# Patient Record
Sex: FEMALE | Race: WHITE | Employment: OTHER | URBAN - METROPOLITAN AREA
[De-identification: names, ages, dates, MRNs, and addresses within clinical notes are randomized per-mention and may not be internally consistent; named-entity substitution may affect disease eponyms.]

---

## 2017-07-26 ENCOUNTER — OFFICE VISIT (OUTPATIENT)
Dept: CARDIOLOGY CLINIC | Age: 77
End: 2017-07-26

## 2017-07-26 VITALS
SYSTOLIC BLOOD PRESSURE: 114 MMHG | DIASTOLIC BLOOD PRESSURE: 60 MMHG | OXYGEN SATURATION: 92 % | BODY MASS INDEX: 37.69 KG/M2 | WEIGHT: 192 LBS | HEART RATE: 76 BPM | HEIGHT: 60 IN

## 2017-07-26 DIAGNOSIS — I10 ESSENTIAL HYPERTENSION: ICD-10-CM

## 2017-07-26 DIAGNOSIS — I25.10 CORONARY ARTERY DISEASE INVOLVING NATIVE CORONARY ARTERY OF NATIVE HEART WITHOUT ANGINA PECTORIS: Primary | ICD-10-CM

## 2017-07-26 PROCEDURE — 99214 OFFICE O/P EST MOD 30 MIN: CPT | Performed by: INTERNAL MEDICINE

## 2017-07-27 ENCOUNTER — TELEPHONE (OUTPATIENT)
Dept: CARDIOLOGY CLINIC | Age: 77
End: 2017-07-27

## 2017-07-27 RX ORDER — LEVOTHYROXINE SODIUM 0.03 MG/1
25 TABLET ORAL DAILY
COMMUNITY
End: 2021-03-17

## 2017-12-05 RX ORDER — CLOPIDOGREL BISULFATE 75 MG/1
TABLET ORAL
Qty: 30 TABLET | Refills: 5 | Status: SHIPPED | OUTPATIENT
Start: 2017-12-05 | End: 2018-06-13 | Stop reason: SDUPTHER

## 2018-01-05 RX ORDER — ATORVASTATIN CALCIUM 40 MG/1
TABLET, FILM COATED ORAL
Qty: 30 TABLET | Refills: 10 | Status: SHIPPED | OUTPATIENT
Start: 2018-01-05 | End: 2018-11-16 | Stop reason: SDUPTHER

## 2018-01-08 RX ORDER — METOPROLOL SUCCINATE 25 MG/1
TABLET, EXTENDED RELEASE ORAL
Qty: 30 TABLET | Refills: 10 | Status: SHIPPED | OUTPATIENT
Start: 2018-01-08 | End: 2019-10-18 | Stop reason: SDUPTHER

## 2018-01-08 RX ORDER — FUROSEMIDE 20 MG/1
TABLET ORAL
Qty: 30 TABLET | Refills: 10 | Status: SHIPPED | OUTPATIENT
Start: 2018-01-08 | End: 2021-03-12 | Stop reason: SDUPTHER

## 2018-01-08 RX ORDER — LOSARTAN POTASSIUM 25 MG/1
TABLET ORAL
Qty: 30 TABLET | Refills: 10 | Status: SHIPPED | OUTPATIENT
Start: 2018-01-08 | End: 2019-11-11 | Stop reason: ALTCHOICE

## 2018-06-15 RX ORDER — CLOPIDOGREL BISULFATE 75 MG/1
TABLET ORAL
Qty: 90 TABLET | Refills: 3 | Status: SHIPPED | OUTPATIENT
Start: 2018-06-15 | End: 2019-10-18 | Stop reason: SDUPTHER

## 2018-07-12 ENCOUNTER — OFFICE VISIT (OUTPATIENT)
Dept: CARDIOLOGY CLINIC | Age: 78
End: 2018-07-12

## 2018-07-12 VITALS
BODY MASS INDEX: 35.93 KG/M2 | HEART RATE: 72 BPM | DIASTOLIC BLOOD PRESSURE: 64 MMHG | SYSTOLIC BLOOD PRESSURE: 112 MMHG | WEIGHT: 183 LBS | OXYGEN SATURATION: 97 % | HEIGHT: 60 IN

## 2018-07-12 DIAGNOSIS — I25.10 CORONARY ARTERY DISEASE INVOLVING NATIVE CORONARY ARTERY OF NATIVE HEART WITHOUT ANGINA PECTORIS: Primary | ICD-10-CM

## 2018-07-12 DIAGNOSIS — I10 ESSENTIAL HYPERTENSION: ICD-10-CM

## 2018-07-12 DIAGNOSIS — I65.29 STENOSIS OF CAROTID ARTERY, UNSPECIFIED LATERALITY: ICD-10-CM

## 2018-07-12 PROCEDURE — 99214 OFFICE O/P EST MOD 30 MIN: CPT | Performed by: INTERNAL MEDICINE

## 2018-07-12 PROCEDURE — 93000 ELECTROCARDIOGRAM COMPLETE: CPT | Performed by: INTERNAL MEDICINE

## 2018-07-12 NOTE — PROGRESS NOTES
bowel sounds,  No organomegaly  Extremities: No Cyanosis or Clubbing; Edema, none  Neurological: Oriented to time, place, and person, Non-anxious  Psychiatric: Normal mood and affect  Skin: Warm and dry,  No rash seen    Outpatient Prescriptions Marked as Taking for the 7/12/18 encounter (Office Visit) with Nita Rangel MD   Medication Sig Dispense Refill    clopidogrel (PLAVIX) 75 MG tablet TAKE ONE TABLET BY MOUTH DAILY 90 tablet 3    furosemide (LASIX) 20 MG tablet TAKE ONE TABLET BY MOUTH DAILY 30 tablet 10    metoprolol succinate (TOPROL XL) 25 MG extended release tablet TAKE ONE TABLET BY MOUTH DAILY 30 tablet 10    losartan (COZAAR) 25 MG tablet TAKE ONE TABLET BY MOUTH DAILY 30 tablet 10    atorvastatin (LIPITOR) 40 MG tablet TAKE ONE TABLET BY MOUTH NIGHTLY 30 tablet 10    levothyroxine (SYNTHROID) 25 MCG tablet Take 25 mcg by mouth Daily      nitroGLYCERIN (NITROSTAT) 0.4 MG SL tablet Place 1 tablet under the tongue every 5 minutes as needed for Chest pain 25 tablet 3    naproxen (NAPROSYN) 500 MG tablet Take 1 tablet by mouth 2 times daily (with meals) 60 tablet 0    aspirin 81 MG tablet Take 81 mg by mouth daily      Insulin Lispro, Human, (HUMALOG KWIKPEN SC) Inject 12 Units into the skin 3 times daily (with meals)      insulin glargine (LANTUS) 100 UNIT/ML injection vial Inject 20 Units into the skin nightly Increase gradually back to baseline level of 42 units as glucose levels increase (Patient taking differently: Inject 42 Units into the skin nightly Increase gradually back to baseline level of 42 units as glucose levels increase) 1 vial 3    Cholecalciferol (VITAMIN D3) 5000 UNITS TABS Take 5,000 Units by mouth daily      vitamin B-12 (CYANOCOBALAMIN) 100 MCG tablet Take 1,000 mcg by mouth 3 times daily          No results for input(s): WBC, HGB, HCT, MCV, PLT in the last 72 hours. No results for input(s): CREATININE, BUN, NA, K, CL, CO2 in the last 72 hours.   No results for input(s):

## 2018-11-19 RX ORDER — ATORVASTATIN CALCIUM 40 MG/1
TABLET, FILM COATED ORAL
Qty: 30 TABLET | Refills: 9 | Status: SHIPPED | OUTPATIENT
Start: 2018-11-19 | End: 2019-10-18 | Stop reason: SDUPTHER

## 2019-10-18 RX ORDER — ATORVASTATIN CALCIUM 40 MG/1
TABLET, FILM COATED ORAL
Qty: 30 TABLET | Refills: 10 | Status: SHIPPED | OUTPATIENT
Start: 2019-10-18 | End: 2020-11-16

## 2019-10-18 RX ORDER — CLOPIDOGREL BISULFATE 75 MG/1
TABLET ORAL
Qty: 90 TABLET | Refills: 3 | Status: SHIPPED | OUTPATIENT
Start: 2019-10-18 | End: 2021-03-12 | Stop reason: SDUPTHER

## 2019-10-18 RX ORDER — METOPROLOL SUCCINATE 25 MG/1
TABLET, EXTENDED RELEASE ORAL
Qty: 30 TABLET | Refills: 10 | Status: SHIPPED | OUTPATIENT
Start: 2019-10-18 | End: 2021-03-12 | Stop reason: SDUPTHER

## 2019-11-11 ENCOUNTER — OFFICE VISIT (OUTPATIENT)
Dept: CARDIOLOGY CLINIC | Age: 79
End: 2019-11-11
Payer: MEDICARE

## 2019-11-11 VITALS
BODY MASS INDEX: 25.32 KG/M2 | HEIGHT: 60 IN | DIASTOLIC BLOOD PRESSURE: 68 MMHG | WEIGHT: 129 LBS | HEART RATE: 65 BPM | OXYGEN SATURATION: 99 % | SYSTOLIC BLOOD PRESSURE: 130 MMHG

## 2019-11-11 DIAGNOSIS — I25.10 CAD IN NATIVE ARTERY: Primary | ICD-10-CM

## 2019-11-11 DIAGNOSIS — I10 ESSENTIAL HYPERTENSION: ICD-10-CM

## 2019-11-11 DIAGNOSIS — Z98.890 HISTORY OF CAROTID ENDARTERECTOMY: ICD-10-CM

## 2019-11-11 PROCEDURE — 99213 OFFICE O/P EST LOW 20 MIN: CPT | Performed by: INTERNAL MEDICINE

## 2019-11-11 PROCEDURE — 93000 ELECTROCARDIOGRAM COMPLETE: CPT | Performed by: INTERNAL MEDICINE

## 2019-11-11 RX ORDER — VALSARTAN 80 MG/1
80 TABLET ORAL DAILY
Qty: 30 TABLET | Refills: 11 | Status: SHIPPED | OUTPATIENT
Start: 2019-11-11 | End: 2020-11-16 | Stop reason: SDUPTHER

## 2020-11-16 NOTE — TELEPHONE ENCOUNTER
Last OV 11/11/19. Next OV 3/12/21. CMP & Lipid panel  6/11/19. Placed order in Epic for fasting Lipid panel and CMP. Called patient and requested her to have lab fasting lab work. Patient verbalized and confirmed understanding.

## 2020-11-17 RX ORDER — ATORVASTATIN CALCIUM 40 MG/1
TABLET, FILM COATED ORAL
Qty: 30 TABLET | Refills: 0 | Status: SHIPPED | OUTPATIENT
Start: 2020-11-17 | End: 2021-03-12 | Stop reason: SDUPTHER

## 2020-11-17 RX ORDER — VALSARTAN 80 MG/1
80 TABLET ORAL DAILY
Qty: 30 TABLET | Refills: 2 | Status: SHIPPED | OUTPATIENT
Start: 2020-11-17 | End: 2021-03-12 | Stop reason: SDUPTHER

## 2021-03-12 RX ORDER — METOPROLOL SUCCINATE 25 MG/1
TABLET, EXTENDED RELEASE ORAL
Qty: 30 TABLET | Refills: 0 | Status: SHIPPED | OUTPATIENT
Start: 2021-03-12 | End: 2021-03-17 | Stop reason: SDUPTHER

## 2021-03-12 RX ORDER — VALSARTAN 80 MG/1
80 TABLET ORAL DAILY
Qty: 30 TABLET | Refills: 0 | Status: SHIPPED | OUTPATIENT
Start: 2021-03-12 | End: 2021-03-17 | Stop reason: SDUPTHER

## 2021-03-12 RX ORDER — FUROSEMIDE 20 MG/1
TABLET ORAL
Qty: 30 TABLET | Refills: 0 | Status: SHIPPED | OUTPATIENT
Start: 2021-03-12 | End: 2021-03-17

## 2021-03-12 RX ORDER — ATORVASTATIN CALCIUM 40 MG/1
TABLET, FILM COATED ORAL
Qty: 30 TABLET | Refills: 0 | Status: SHIPPED | OUTPATIENT
Start: 2021-03-12 | End: 2021-03-17 | Stop reason: SDUPTHER

## 2021-03-12 RX ORDER — CLOPIDOGREL BISULFATE 75 MG/1
TABLET ORAL
Qty: 90 TABLET | Refills: 0 | Status: SHIPPED | OUTPATIENT
Start: 2021-03-12 | End: 2021-03-17 | Stop reason: SDUPTHER

## 2021-03-12 NOTE — PROGRESS NOTES
[address]   Office Progress Note        CC: \"I am doing fine \"      Tia Casiano     HPI:  Isabelle Dos Santos is a 78 y.o. patient with PMH of DM type II, carotid artery stenosis s/p stent and recent acute MI. She was hospitalized 8/15-8/19 2015 after presenting to [facility] with chest heaviness accompanied with mild diaphoresis and was found to have acute anterior wall MI. She was airlifted to [facility] where [facility] revealed occluded mid LAD. This was successfully intervened on with REYNA placed. Patient had allergy to aspirin and post procedure she was densensitized to this without issues. Echo showed reduced EF 35%. Patient returned to [facility] 1/31/17 with chest pain, described as chest pressure heaviness, across chest and radiating to back with arm numbness . Worse when she sits up . Better when she roles on the side. No dyspnea or palptiations. ECG shows LBBB; Enzymes negative. CT of the chest negative for PE or Aortic aneurysm. She was admitted and underwent stress testing 2/1/17 showing large sized mid anterior, apical anterior, and apical minimally reversible defect. No intervention was done. Patient comes for yearly f/u of CAD. Today, the patient denies exertional chest pain, palpitations, dizziness, syncope, worsening leg swelling and worsening dyspnea. She has lost about 100 pounds since her MI by eating more healthy and by exercising regularly. She is tolerating her medications. Physical Examination:    /70   Pulse 84   Temp 96.8 °F (36 °C)   Ht 5' (1.524 m)   Wt 105 lb (47.6 kg)   SpO2 98%   BMI 20.51 kg/m²      HEENT:  Face: Atraumatic, Conjunctiva: Pink; non icteric,  Mucous Memb:  Moist, No thyromegaly or Lymphadenopathy  Respiratory:  Resp Assessment: normal, Resp Auscultation: clear  Cardiovascular: Auscultation: nl S1 & S2, Palpation:  Nl PMI;  No heaves or thrills, JVP:  normal  Abdomen: Soft, non-tender, Normal bowel sounds,  No organomegaly  Extremities: No Cyanosis or dominant coronary arterial system with a 60% proximal to mid RCA  lesion in the left system. There is no significant left main disease. The  prior placed LAD stent is without significant restenosis. There is diffuse  50% stenosis downstream from this in the LAD which is a small vessel. In the  2nd obtuse marginal branch there was a 90% lesion which was intervened upon  with a 2.25 x 12 mm Xience drug-eluting stent dilated to 2.4 mm with 0%  residual stenosis and EBONIE-3 flow. 2. Mildly reduced left ventricular systolic function with an LV ejection  fraction of 45% and apical hypokinesis. This was much improved from the  prior LV gram where the ejection fraction was less than 40%. 3. Normal left ventricular end-diastolic pressure. 4. No gradient across the aortic valve on pull back to suggest aortic  Stenosis.     ECHO 8/17/2015:  Normal LV size and systolic function: EF is 60%. Grade I diastolic dysfunction. There is severe hypokinesis of the mid to apical anteroseptal and inferoseptal walls. There is mild mitral and tricuspid regurgitation  RVSP estimated at 39 mmHg. This is suggestive of mild pulmonary hypertension.      [facility] 8/15/2015:  1.  A 2-3 vessel coronary artery disease.     2.  Mid LAD occlusion. 3.  Ostial circumflex has 70%, OM2 had 90% and RCA has a 50% to 60% lesion. 4. RCA has a 50-60% proximal lesion  5.  Normal left ventricular size with moderately reduced systolic function, ejection fraction 35%. 6. Successful angioplasty and stenting of the occluded mid LAD lesion reduced from 100% to 0%.  A 2.75 x 15 Alpine drug-coated stent was deployed.        Assessment/Plan:      CAD  Previous angioplasty and stenting of mid LAD 2015  Continue BB, statin and ASA. May take plavix every other day until Rx runs out, then may stop indefinitely. No angina. Essential Hypertension  Controlled  Has lost approx 50 pounds. Losartan recalled.  Switched to Valsartan 80 mg daily    Carotid Stenosis  Had carotid endarterectomy 2011 (Dr. Monalisa Baugh)    HLD  LDL goal <70. Taking atorvastatin 40. LDL 82 3/2021    DM. HgAIC 6.1 per pt. Follow up in 1 year     Thank you very much for allowing me to participate in the care of your patient. Please do not hesitate to contact me if you have any questions. Sincerely,    Zhen Bernard M.D  [facility], [address] [place] [address] [place] [address]  Ph: [phone]  Fax: [phone]    This note was scribed in the presence of the physician by Angie Miner RN. Physician Attestation:  The scribes documentation has been prepared under my direction and personally reviewed by me in its entirety. I confirm that the note above accurately reflects all work, treatment, procedures, and medical decision making performed by me.

## 2021-03-12 NOTE — TELEPHONE ENCOUNTER
PT showed up on 3/12 for an appt w/ Dr Amaya/ we had tried to reach pt to reschedule due to his schedule. Could  Not reach her. Pt very upset/ Rescheduled for LB next week. Said she needs ALL her meds ASAP.     Medication Refill    Medication needing refilled:ATORVASTATIN  Dosage of the medication:40mg    How are you taking this medication (QD, BID, TID, QID, PRN):TAKE ONE TABLET BY MOUTH EVERY EVENING    30 or 90 day supply called in:30    When will you run out of your medication:now    Which Pharmacy are we sending the medication to?:[address] [phone]         Medication Refill    Medication needing refilled:VALSARTAN    Dosage of the medication:80mg    How are you taking this medication (QD, BID, TID, QID, PRN) Take 1 tablet by mouth daily    30 or 90 day supply called in:30  When will you run out of your medication:NOW    Which Pharmacy are we sending the medication to?:[address] [phone]     Medication Refill    Medication needing refilled:METOPROLOL SUCCINATE     Dosage of the medication:25mg    How are you taking this medication (QD, BID, TID, QID, PRN):TAKE ONE TABLET BY MOUTH DAILY    30 or 90 day supply called in:30    When will you run out of your medication:NOW    Which Pharmacy are we sending the medication to?:[address] [phone]             Medication Refill    Medication needing refilled:CLOPODOGREL     Dosage of the medication:75mg    How are you taking this medication (QD, BID, TID, QID, PRN):TAKE ONE TABLET BY MOUTH DAILY    30 or 90 day supply called in:90    When will you run out of your medication:NOW    Which Pharmacy are we sending the medication to?:[address] [phone]     Medication Refill    Medication needing refilled:FUROSEMIDE    Dosage of the medication:20mg    How are you taking this medication (QD, BID, TID, QID, PRN):TAKE ONE TABLET BY MOUTH DAILY    30 or 90 day supply called in:30    When will you run out of your medication:NOW    Which Pharmacy are we sending the medication to?:[facility] -  [phone]     Medication Refill    Medication needing refilled:NITROGLYCERIN    Dosage of the medication:0.4mg    How are you taking this medication (QD, BID, TID, QID, PRN):Place 1 tablet under the tongue every 5 minutes as needed for Chest pain    30 or 90 day supply called in:25 tablets     When will you run out of your medication:NOW    Which Pharmacy are we sending the medication to? [facility] [address] [facility] -  [phone] :  Medication Refill    Medication needing refilled:INSULIN GLARGINE    Dosage of the medication:20 units     How are you taking this medication (QD, BID, TID, QID, PRN):  Sig: Inject 20 Units into the skin nightly Increase gradually back to baseline level of 42 units as glucose levels increase   Patient taking differently: Inject 42 Units into the skin nightly Increase gradually back to baseline level of 42 units as glucose levels increase         30 or 90 day supply called in:1 vial     When will you run out of your medication:NOW    Which Pharmacy are we sending the medication to?:[facility] [address]

## 2021-03-17 ENCOUNTER — OFFICE VISIT (OUTPATIENT)
Dept: CARDIOLOGY CLINIC | Age: 81
End: 2021-03-17
Payer: MEDICARE

## 2021-03-17 VITALS
TEMPERATURE: 96.8 F | SYSTOLIC BLOOD PRESSURE: 122 MMHG | DIASTOLIC BLOOD PRESSURE: 70 MMHG | OXYGEN SATURATION: 98 % | BODY MASS INDEX: 20.62 KG/M2 | WEIGHT: 105 LBS | HEART RATE: 84 BPM | HEIGHT: 60 IN

## 2021-03-17 DIAGNOSIS — I25.10 CAD IN NATIVE ARTERY: Primary | ICD-10-CM

## 2021-03-17 DIAGNOSIS — E78.00 PURE HYPERCHOLESTEROLEMIA: ICD-10-CM

## 2021-03-17 DIAGNOSIS — I65.29 STENOSIS OF CAROTID ARTERY, UNSPECIFIED LATERALITY: ICD-10-CM

## 2021-03-17 PROCEDURE — 99214 OFFICE O/P EST MOD 30 MIN: CPT | Performed by: INTERNAL MEDICINE

## 2021-03-17 PROCEDURE — 93000 ELECTROCARDIOGRAM COMPLETE: CPT | Performed by: INTERNAL MEDICINE

## 2021-03-17 RX ORDER — CLOPIDOGREL BISULFATE 75 MG/1
TABLET ORAL
Qty: 90 TABLET | Refills: 3 | Status: SHIPPED | OUTPATIENT
Start: 2021-03-17 | End: 2021-03-17

## 2021-03-17 RX ORDER — METOPROLOL SUCCINATE 25 MG/1
TABLET, EXTENDED RELEASE ORAL
Qty: 90 TABLET | Refills: 3 | Status: SHIPPED | OUTPATIENT
Start: 2021-03-17

## 2021-03-17 RX ORDER — NITROGLYCERIN 0.4 MG/1
0.4 TABLET SUBLINGUAL EVERY 5 MIN PRN
Qty: 25 TABLET | Refills: 3 | Status: SHIPPED | OUTPATIENT
Start: 2021-03-17

## 2021-03-17 RX ORDER — ATORVASTATIN CALCIUM 40 MG/1
TABLET, FILM COATED ORAL
Qty: 90 TABLET | Refills: 3 | Status: SHIPPED | OUTPATIENT
Start: 2021-03-17

## 2021-03-17 RX ORDER — VALSARTAN 80 MG/1
80 TABLET ORAL DAILY
Qty: 90 TABLET | Refills: 3 | Status: SHIPPED | OUTPATIENT
Start: 2021-03-17

## 2021-03-17 NOTE — PATIENT INSTRUCTIONS
Patient Education      TAKE PLAVIX EVERY OTHER DAY UNTIL PRESCRIPTION RUNS OUT THEN MAY STOP PLAVIX  Heart-Healthy Diet: Care Instructions  Your Care Instructions     A heart-healthy diet has lots of vegetables, fruits, nuts, beans, and whole grains, and is low in salt. It limits foods that are high in saturated fat, such as meats, cheeses, and fried foods. It may be hard to change your diet, but even small changes can lower your risk of heart attack and heart disease. Follow-up care is a key part of your treatment and safety. Be sure to make and go to all appointments, and call your doctor if you are having problems. It's also a good idea to know your test results and keep a list of the medicines you take. How can you care for yourself at home? Watch your portions  · Use food labels to learn what the recommended servings are for the foods you eat. · Eat only the number of calories you need to stay at a healthy weight. If you need to lose weight, eat fewer calories than your body burns (through exercise and other physical activity). Eat more fruits and vegetables  · Eat a variety of fruit and vegetables every day. Dark green, deep orange, red, or yellow fruits and vegetables are especially good for you. Examples include spinach, carrots, peaches, and berries. · Keep carrots, celery, and other veggies handy for snacks. Buy fruit that is in season and store it where you can see it so that you will be tempted to eat it. · Cook dishes that have a lot of veggies in them, such as stir-fries and soups. Limit saturated fat  · Read food labels, and try to avoid saturated fats. They increase your risk of heart disease. · Use olive or canola oil when you cook. · Bake, broil, grill, or steam foods instead of frying them. · Choose lean meats instead of high-fat meats such as hot dogs and sausages. Cut off all visible fat when you prepare meat.   · Eat fish, skinless poultry, and meat alternatives such as soy products instead of high-fat meats. Soy products, such as tofu, may be especially good for your heart. · Choose low-fat or fat-free milk and dairy products. Eat foods high in fiber  · Eat a variety of grain products every day. Include whole-grain foods that have lots of fiber and nutrients. Examples of whole-grain foods include oats, whole wheat bread, and brown rice. · Buy whole-grain breads and cereals, instead of white bread or pastries. Limit salt and sodium  · Limit how much salt and sodium you eat to help lower your blood pressure. · Taste food before you salt it. Add only a little salt when you think you need it. With time, your taste buds will adjust to less salt. · Eat fewer snack items, fast foods, and other high-salt, processed foods. Check food labels for the amount of sodium in packaged foods. · Choose low-sodium versions of canned goods (such as soups, vegetables, and beans). Limit sugar  · Limit drinks and foods with added sugar. These include candy, desserts, and soda pop. Limit alcohol  · Limit alcohol to no more than 2 drinks a day for men and 1 drink a day for women. Too much alcohol can cause health problems. When should you call for help? Watch closely for changes in your health, and be sure to contact your doctor if:    · You would like help planning heart-healthy meals. Where can you learn more? Go to https://Zumboxpereenaeb.healthSavvyCard. org and sign in to your [employer] account. Enter V137 in the [facility] box to learn more about \"Heart-Healthy Diet: Care Instructions. \"     If you do not have an account, please click on the \"Sign Up Now\" link. Current as of: December 17, 2020               Content Version: 12.8  © 6818-5660 [employer], [employer]. Care instructions adapted under license by [facility] ([facility]).  If you have questions about a medical condition or this instruction, always ask your healthcare professional. Elana Seaman disclaims any warranty or liability for your use of this information.